# Patient Record
(demographics unavailable — no encounter records)

---

## 2024-10-15 NOTE — HISTORY OF PRESENT ILLNESS
[de-identified] : 44F here in followup visit s/p SMR/ESS (maxillary, ethmoid, sphenoid, frontal) 10/31/22 for chronic eosinophilic pansinusitis. Intraoperatively, mucosal edema throughout.  Since last seen 6 months ago, from sinonasal standpoint, she is doing great. Nasal breathing is great and there is no congestion. She remains on BID steroid rinses and astelin/xyzal.  ROS otherwise unremarkable.

## 2024-10-15 NOTE — ASSESSMENT
[FreeTextEntry1] : 44F here in followup visit s/p SMR/ESS (maxillary, ethmoid, sphenoid, frontal) 10/31/22 for chronic eosinophilic pansinusitis. Intraoperatively, mucosal edema throughout. Since last seen 7 months ago, from sinonasal standpoint, she is doing great. Nasal breathing is great and there is no congestion. She remains on BID steroid rinses and astelin/xyzal. On exam, nasal endoscopy shows well healed postoperative changes widely patent nasal airways and paranasal sinuses. She is doing great. Continue current regimen of budesonide rinse with astelin/xyzal. RTO 6 months.

## 2024-10-15 NOTE — PROCEDURE
[FreeTextEntry3] : Nasal Endoscopy: middle meati and paranasal sinuses widely patent no mucopus or mucin minimal polyps right frontal recess - nonobstructive choana clear

## 2024-10-15 NOTE — CONSULT LETTER
[Dear  ___] : Dear  [unfilled], [Courtesy Letter:] : I had the pleasure of seeing your patient, [unfilled], in my office today. [Consult Closing:] : Thank you very much for allowing me to participate in the care of this patient.  If you have any questions, please do not hesitate to contact me. [Sincerely,] : Sincerely, [FreeTextEntry3] : James Callaway MD\par  Department of Otolaryngology - Head and Neck Surgery\par  [facility] [DrSary  ___] : Dr. ZAMORA

## 2024-10-15 NOTE — PHYSICAL EXAM
[Nasal Endoscopy Performed] : nasal endoscopy was performed, see procedure section for findings [Midline] : trachea located in midline position [Normal] : no rashes [FreeTextEntry1] : Au: EAC clear, TM intact and mobile, ME clear

## 2025-05-29 NOTE — CONSULT LETTER
[Dear  ___] : Dear  [unfilled], [Courtesy Letter:] : I had the pleasure of seeing your patient, [unfilled], in my office today. [Consult Closing:] : Thank you very much for allowing me to participate in the care of this patient.  If you have any questions, please do not hesitate to contact me. [Sincerely,] : Sincerely, [FreeTextEntry3] : James Callaway MD\par  Department of Otolaryngology - Head and Neck Surgery\par  [facility] [DrSary  ___] : Dr. ZAMORA

## 2025-05-29 NOTE — PHYSICAL EXAM
[Nasal Endoscopy Performed] : nasal endoscopy was performed, see procedure section for findings [Midline] : trachea located in midline position [Normal] : no rashes [FreeTextEntry1] : Au: EAC clear, TM intact and mobile, ME clear English

## 2025-05-29 NOTE — HISTORY OF PRESENT ILLNESS
[de-identified] : 44F here in followup visit s/p SMR/ESS (maxillary, ethmoid, sphenoid, frontal) 10/31/22 for chronic eosinophilic pansinusitis. Intraoperatively, mucosal edema throughout.  Since last seen 7 months ago, from sinonasal standpoint, she is doing great. Nasal breathing is great and there is no congestion. She remains on BID steroid rinses and astelin/xyzal.  ROS otherwise unremarkable.

## 2025-05-29 NOTE — PROCEDURE
[FreeTextEntry3] : Nasal Endoscopy: middle meati and paranasal sinuses widely patent no mucopus or mucin, no obvious polyps choana clear